# Patient Record
Sex: FEMALE | Race: BLACK OR AFRICAN AMERICAN
[De-identification: names, ages, dates, MRNs, and addresses within clinical notes are randomized per-mention and may not be internally consistent; named-entity substitution may affect disease eponyms.]

---

## 2017-07-22 ENCOUNTER — HEALTH MAINTENANCE LETTER (OUTPATIENT)
Age: 42
End: 2017-07-22

## 2019-08-22 ENCOUNTER — ANCILLARY PROCEDURE (OUTPATIENT)
Dept: GENERAL RADIOLOGY | Facility: CLINIC | Age: 44
End: 2019-08-22
Attending: FAMILY MEDICINE

## 2019-08-22 ENCOUNTER — OFFICE VISIT (OUTPATIENT)
Dept: URGENT CARE | Facility: URGENT CARE | Age: 44
End: 2019-08-22

## 2019-08-22 VITALS
HEIGHT: 61 IN | TEMPERATURE: 97.1 F | DIASTOLIC BLOOD PRESSURE: 72 MMHG | BODY MASS INDEX: 41.84 KG/M2 | WEIGHT: 221.6 LBS | OXYGEN SATURATION: 100 % | SYSTOLIC BLOOD PRESSURE: 124 MMHG | HEART RATE: 78 BPM

## 2019-08-22 DIAGNOSIS — S59.902A INJURY OF LEFT ELBOW, INITIAL ENCOUNTER: ICD-10-CM

## 2019-08-22 DIAGNOSIS — S49.92XA SHOULDER INJURY, LEFT, INITIAL ENCOUNTER: Primary | ICD-10-CM

## 2019-08-22 PROCEDURE — 99203 OFFICE O/P NEW LOW 30 MIN: CPT | Performed by: FAMILY MEDICINE

## 2019-08-22 PROCEDURE — 73080 X-RAY EXAM OF ELBOW: CPT | Mod: LT

## 2019-08-22 PROCEDURE — 73030 X-RAY EXAM OF SHOULDER: CPT | Mod: LT

## 2019-08-22 RX ORDER — NAPROXEN 500 MG/1
500 TABLET ORAL 2 TIMES DAILY WITH MEALS
Qty: 14 TABLET | Refills: 0 | Status: SHIPPED | OUTPATIENT
Start: 2019-08-22 | End: 2019-08-29

## 2019-08-22 ASSESSMENT — MIFFLIN-ST. JEOR: SCORE: 1587.55

## 2019-08-22 NOTE — LETTER
[facility] CARE [address] [address]  [phone]        2019    REPORT OF WORK ABILITY    PATIENT DATA  Employee Name: Sadia Foster        : 1975   [id number]  Work related injury: YES  Today's date: 2019  Date of injury: 2019     PROVIDER EVALUATION: Please fill in as needed.  Please give copy to employee for employer.  1. Diagnosis: Sprain/strain/contusion left shoulder and elbow  2. Treatment: ice/rest  3. Medication: Naprosyn  NOTE: When ordering a medication, [place] Rules require Work Comp or WC on prescriptions.  4. Return to work date: Saturday with the following: * Restricted lifting - Maximum lift in pounds: 10  DURATION OF LIMITATIONS: 1 week      RESTRICTIONS: Unlimited unless listed.  Restrictions apply to home and leisure also.  If work within restrictions is not available, the employee is totally disabled.  Provider comments: f/u Work Comp recheck  Medical Examiner: Troy Fatima DO      License or registration: [id number]    Next appointment: f/u work Comp Doctor recheck    CC: Employer, Managed Care Plan/Payor, Patient

## 2019-08-22 NOTE — PROGRESS NOTES
"SUBJECTIVE:  Chief Complaint   Patient presents with     Urgent Care     Work Comp     Pt states  left elbow upper shoulder pain , fell at work sxs x today    .ident presents with a chief complaint of left elbow and shoulder.  The injury occurred 2 hours ago.   The injury happened while at work.   How: fall  immediate pain  The patient complained of moderate pain and has had decreased ROM.    Pain exacerbated by movement    He treated it initially with no therapy.   This is the first time this type of injury has occurred to this patient.     No past medical history on file.  No Known Allergies  Social History     Tobacco Use     Smoking status: Never Smoker     Smokeless tobacco: Never Used   Substance Use Topics     Alcohol use: Not on file       ROSINTEGUMENTARY/SKIN: NEGATIVE for open wound/bleeding and NEGATIVE for bruising    EXAM: /72   Pulse 78   Temp 97.1  F (36.2  C) (Oral)   Ht 1.549 m (5' 1\")   Wt 100.5 kg (221 lb 9.6 oz)   SpO2 100%   BMI 41.87 kg/m     Gen: healthy,alert,no distress  Extremity: elbow and shoulder has pain with palpation and rom.   There is not compromise to the distal circulation.  Pulses are +2 and CRT is brisk.GENERAL APPEARANCE: healthy, alert and no distress  EXTREMITIES: peripheral pulses normal  SKIN: no suspicious lesions or rashes  NEURO: Normal strength and tone, sensory exam grossly normal, mentation intact and speech normal    Xray without acute findings, no fx read by Troy Fatima D.O.      ICD-10-CM    1. Shoulder injury, left, initial encounter S49.92XA XR Shoulder Left G/E 3 Views     naproxen (NAPROSYN) 500 MG tablet   2. Injury of left elbow, initial encounter S59.902A XR Elbow Left G/E 3 Views     naproxen (NAPROSYN) 500 MG tablet     RICE    "

## 2019-11-04 ENCOUNTER — HEALTH MAINTENANCE LETTER (OUTPATIENT)
Age: 44
End: 2019-11-04

## 2020-11-16 ENCOUNTER — HEALTH MAINTENANCE LETTER (OUTPATIENT)
Age: 45
End: 2020-11-16

## 2021-02-07 ENCOUNTER — HEALTH MAINTENANCE LETTER (OUTPATIENT)
Age: 46
End: 2021-02-07

## 2021-05-24 ENCOUNTER — RECORDS - HEALTHEAST (OUTPATIENT)
Dept: ADMINISTRATIVE | Facility: CLINIC | Age: 46
End: 2021-05-24

## 2021-09-18 ENCOUNTER — HEALTH MAINTENANCE LETTER (OUTPATIENT)
Age: 46
End: 2021-09-18

## 2022-01-08 ENCOUNTER — HEALTH MAINTENANCE LETTER (OUTPATIENT)
Age: 47
End: 2022-01-08

## 2022-03-05 ENCOUNTER — HEALTH MAINTENANCE LETTER (OUTPATIENT)
Age: 47
End: 2022-03-05

## 2022-11-20 ENCOUNTER — HEALTH MAINTENANCE LETTER (OUTPATIENT)
Age: 47
End: 2022-11-20

## 2023-04-15 ENCOUNTER — HEALTH MAINTENANCE LETTER (OUTPATIENT)
Age: 48
End: 2023-04-15